# Patient Record
Sex: MALE | Race: WHITE | Employment: FULL TIME | URBAN - METROPOLITAN AREA
[De-identification: names, ages, dates, MRNs, and addresses within clinical notes are randomized per-mention and may not be internally consistent; named-entity substitution may affect disease eponyms.]

---

## 2017-06-15 PROBLEM — F90.0 ADHD (ATTENTION DEFICIT HYPERACTIVITY DISORDER), INATTENTIVE TYPE: Status: ACTIVE | Noted: 2017-06-15

## 2022-03-19 PROBLEM — F90.0 ADHD (ATTENTION DEFICIT HYPERACTIVITY DISORDER), INATTENTIVE TYPE: Status: ACTIVE | Noted: 2017-06-15

## 2022-05-18 ENCOUNTER — HOSPITAL ENCOUNTER (OUTPATIENT)
Dept: LAB | Age: 47
Discharge: HOME | End: 2022-05-18

## 2022-05-18 PROCEDURE — 88305 TISSUE EXAM BY PATHOLOGIST: CPT

## 2025-01-13 ENCOUNTER — PREP FOR PROCEDURE (OUTPATIENT)
Dept: ONCOLOGY | Age: 50
End: 2025-01-13

## 2025-01-13 ENCOUNTER — OFFICE VISIT (OUTPATIENT)
Dept: UROLOGY | Age: 50
End: 2025-01-13
Payer: COMMERCIAL

## 2025-01-13 DIAGNOSIS — N50.89 MASS OF RIGHT TESTICLE: Primary | ICD-10-CM

## 2025-01-13 DIAGNOSIS — N50.89 MASS OF RIGHT TESTICLE: ICD-10-CM

## 2025-01-13 LAB
BILIRUBIN, URINE, POC: NEGATIVE
BLOOD URINE, POC: NEGATIVE
GLUCOSE URINE, POC: NEGATIVE MG/DL
KETONES, URINE, POC: NEGATIVE MG/DL
LDH SERPL L TO P-CCNC: 199 U/L (ref 127–281)
LEUKOCYTE ESTERASE, URINE, POC: NEGATIVE
NITRITE, URINE, POC: NEGATIVE
PH, URINE, POC: 5.5 (ref 4.6–8)
PROTEIN,URINE, POC: NEGATIVE MG/DL
SPECIFIC GRAVITY, URINE, POC: 1 (ref 1–1.03)
URINALYSIS CLARITY, POC: NORMAL
URINALYSIS COLOR, POC: NORMAL
UROBILINOGEN, POC: NORMAL MG/DL

## 2025-01-13 PROCEDURE — 99204 OFFICE O/P NEW MOD 45 MIN: CPT | Performed by: UROLOGY

## 2025-01-13 PROCEDURE — 81003 URINALYSIS AUTO W/O SCOPE: CPT | Performed by: UROLOGY

## 2025-01-13 ASSESSMENT — ENCOUNTER SYMPTOMS
INDIGESTION: 0
CONSTIPATION: 0
EYE DISCHARGE: 0
SKIN LESIONS: 0
VOMITING: 0
COUGH: 0
ABDOMINAL PAIN: 0
EYE PAIN: 0
SHORTNESS OF BREATH: 0
DIARRHEA: 0
NAUSEA: 0
WHEEZING: 0
BLOOD IN STOOL: 0
BACK PAIN: 0
HEARTBURN: 0

## 2025-01-14 ENCOUNTER — TELEPHONE (OUTPATIENT)
Dept: ONCOLOGY | Age: 50
End: 2025-01-14

## 2025-01-14 LAB
AFP-TM SERPL-MCNC: 2.15 NG/ML (ref 0–8.3)
HCG INTACT+B SERPL-ACNC: <1 MIU/ML (ref 0–3)

## 2025-01-14 RX ORDER — IBUPROFEN 200 MG
400 TABLET ORAL EVERY 6 HOURS PRN
COMMUNITY

## 2025-01-14 NOTE — TELEPHONE ENCOUNTER
Attempted to return call to Sophia Cheung with [facility] about authorization.  The number given was not the correct number.    Attempted to reach [place] via teams messaging, as well as updating the referral where the prior auth was discussed.    Also sent an email over to Sophia as well with the needed information.

## 2025-01-14 NOTE — PERIOP NOTE
Patient verified name and .  Order for consent NOT found in EHR at time of PAT visit. Unable to verify case posting against order; surgery verified by patient.    Type 1B- listed as orchiectomy scrotal- surgery, phone assessment complete.  Orders not received.  Labs per surgeon: none ordered  Labs per anesthesia protocol: not indicated    Staff message sent to ORALIA Batres to clarify if the case is OP and if ERAS is indicated-- the patient stated \"I thought it was outpatient\", the case is marked surgery admit and ERAS.  Patient was seen by Dr. Ricks 25.     Patient answered medical/surgical history questions at their best of ability. All prior to admission medications documented in EPIC.    Patient instructed to continue taking all prescription medications up to the day of surgery but to take only the following medications the day of surgery according to anesthesia guidelines with a small sip of water: Adderall if needed.   Also, patient is requested to take 2 Tylenol in the morning and then again before bed on the day before surgery. Regular or extra strength may be used.       Patient informed that all vitamins and supplements should be held 7 days prior to surgery and NSAIDS 5 days prior to surgery. Prescription meds to hold:  none    Patient instructed on the following:    > Arrive at Main Entrance, time of arrival to be called the day before by 1700  > NPO after midnight, unless otherwise indicated, including gum, mints, and ice chips  > Please drink 32 ounces of non-caffeinated clear liquids 2 hours prior to your arrival to avoid dehydration.   > Responsible adult must drive patient to the hospital, stay during surgery, and patient will need supervision 24 hours after anesthesia  > Use non moisturizing soap in shower the night before surgery and on the morning of surgery  > All piercings must be removed prior to arrival.    > Leave all valuables (money and jewelry) at home but bring insurance card and

## 2025-01-14 NOTE — PERIOP NOTE
RE: surgery  Received: Today  Sparkle Batres Mary C RN  I verified with Dr. Hardy team, and the patient is correct.  This is to be outpatient surgery with NO eras protocol.    I apologize for the confusion.    I will call and update the patient      ERAS banner removed

## 2025-01-14 NOTE — TELEPHONE ENCOUNTER
Sophia with [facility] calling on behalf of pt to confirm prior authorization for surgery.    [phone] ex [phone]

## 2025-01-15 ENCOUNTER — ANESTHESIA EVENT (OUTPATIENT)
Dept: SURGERY | Age: 50
End: 2025-01-15
Payer: COMMERCIAL

## 2025-01-15 RX ORDER — SODIUM CHLORIDE 0.9 % (FLUSH) 0.9 %
5-40 SYRINGE (ML) INJECTION EVERY 12 HOURS SCHEDULED
Status: CANCELLED | OUTPATIENT
Start: 2025-01-15

## 2025-01-15 RX ORDER — SODIUM CHLORIDE 9 MG/ML
INJECTION, SOLUTION INTRAVENOUS PRN
Status: CANCELLED | OUTPATIENT
Start: 2025-01-15

## 2025-01-15 RX ORDER — SODIUM CHLORIDE 0.9 % (FLUSH) 0.9 %
5-40 SYRINGE (ML) INJECTION PRN
Status: CANCELLED | OUTPATIENT
Start: 2025-01-15

## 2025-01-16 ENCOUNTER — HOSPITAL ENCOUNTER (OUTPATIENT)
Age: 50
Setting detail: OUTPATIENT SURGERY
Discharge: HOME | End: 2025-01-16
Attending: UROLOGY | Admitting: UROLOGY
Payer: COMMERCIAL

## 2025-01-16 ENCOUNTER — ANESTHESIA (OUTPATIENT)
Dept: SURGERY | Age: 50
End: 2025-01-16
Payer: COMMERCIAL

## 2025-01-16 VITALS
SYSTOLIC BLOOD PRESSURE: 126 MMHG | RESPIRATION RATE: 18 BRPM | DIASTOLIC BLOOD PRESSURE: 74 MMHG | BODY MASS INDEX: 35.62 KG/M2 | HEIGHT: 72 IN | TEMPERATURE: 98.3 F | HEART RATE: 76 BPM | OXYGEN SATURATION: 93 % | WEIGHT: 263 LBS

## 2025-01-16 DIAGNOSIS — N50.89 MASS OF RIGHT TESTICLE: Primary | ICD-10-CM

## 2025-01-16 PROCEDURE — 3600000002 HC SURGERY LEVEL 2 BASE: Performed by: UROLOGY

## 2025-01-16 PROCEDURE — 2500000003 HC RX 250 WO HCPCS: Performed by: PHYSICIAN ASSISTANT

## 2025-01-16 PROCEDURE — 6370000000 HC RX 637 (ALT 250 FOR IP): Performed by: ANESTHESIOLOGY

## 2025-01-16 PROCEDURE — 7100000011 HC PHASE II RECOVERY - ADDTL 15 MIN: Performed by: UROLOGY

## 2025-01-16 PROCEDURE — 3600000012 HC SURGERY LEVEL 2 ADDTL 15MIN: Performed by: UROLOGY

## 2025-01-16 PROCEDURE — 6360000002 HC RX W HCPCS: Performed by: UROLOGY

## 2025-01-16 PROCEDURE — 3700000001 HC ADD 15 MINUTES (ANESTHESIA): Performed by: UROLOGY

## 2025-01-16 PROCEDURE — 6360000002 HC RX W HCPCS: Performed by: NURSE ANESTHETIST, CERTIFIED REGISTERED

## 2025-01-16 PROCEDURE — 7100000000 HC PACU RECOVERY - FIRST 15 MIN: Performed by: UROLOGY

## 2025-01-16 PROCEDURE — 54530 REMOVAL OF TESTIS: CPT | Performed by: UROLOGY

## 2025-01-16 PROCEDURE — 7100000001 HC PACU RECOVERY - ADDTL 15 MIN: Performed by: UROLOGY

## 2025-01-16 PROCEDURE — 7100000010 HC PHASE II RECOVERY - FIRST 15 MIN: Performed by: UROLOGY

## 2025-01-16 PROCEDURE — 6360000002 HC RX W HCPCS: Performed by: PHYSICIAN ASSISTANT

## 2025-01-16 PROCEDURE — 2709999900 HC NON-CHARGEABLE SUPPLY: Performed by: UROLOGY

## 2025-01-16 PROCEDURE — 6360000002 HC RX W HCPCS: Performed by: ANESTHESIOLOGY

## 2025-01-16 PROCEDURE — 2500000003 HC RX 250 WO HCPCS: Performed by: NURSE ANESTHETIST, CERTIFIED REGISTERED

## 2025-01-16 PROCEDURE — 3700000000 HC ANESTHESIA ATTENDED CARE: Performed by: UROLOGY

## 2025-01-16 PROCEDURE — 88305 TISSUE EXAM BY PATHOLOGIST: CPT

## 2025-01-16 PROCEDURE — 6360000002 HC RX W HCPCS: Performed by: REGISTERED NURSE

## 2025-01-16 RX ORDER — SODIUM CHLORIDE 0.9 % (FLUSH) 0.9 %
5-40 SYRINGE (ML) INJECTION PRN
Status: DISCONTINUED | OUTPATIENT
Start: 2025-01-16 | End: 2025-01-16 | Stop reason: HOSPADM

## 2025-01-16 RX ORDER — ACETAMINOPHEN 500 MG
1000 TABLET ORAL ONCE
Status: COMPLETED | OUTPATIENT
Start: 2025-01-16 | End: 2025-01-16

## 2025-01-16 RX ORDER — BUPIVACAINE HYDROCHLORIDE 2.5 MG/ML
INJECTION, SOLUTION EPIDURAL; INFILTRATION; INTRACAUDAL PRN
Status: DISCONTINUED | OUTPATIENT
Start: 2025-01-16 | End: 2025-01-16 | Stop reason: ALTCHOICE

## 2025-01-16 RX ORDER — SODIUM CHLORIDE, SODIUM LACTATE, POTASSIUM CHLORIDE, CALCIUM CHLORIDE 600; 310; 30; 20 MG/100ML; MG/100ML; MG/100ML; MG/100ML
INJECTION, SOLUTION INTRAVENOUS CONTINUOUS
Status: DISCONTINUED | OUTPATIENT
Start: 2025-01-16 | End: 2025-01-16 | Stop reason: HOSPADM

## 2025-01-16 RX ORDER — SENNA AND DOCUSATE SODIUM 50; 8.6 MG/1; MG/1
1 TABLET, FILM COATED ORAL DAILY
Qty: 30 TABLET | Refills: 0 | Status: SHIPPED | OUTPATIENT
Start: 2025-01-16

## 2025-01-16 RX ORDER — SODIUM CHLORIDE 9 MG/ML
INJECTION, SOLUTION INTRAVENOUS PRN
Status: DISCONTINUED | OUTPATIENT
Start: 2025-01-16 | End: 2025-01-16 | Stop reason: HOSPADM

## 2025-01-16 RX ORDER — SODIUM CHLORIDE 0.9 % (FLUSH) 0.9 %
5-40 SYRINGE (ML) INJECTION EVERY 12 HOURS SCHEDULED
Status: DISCONTINUED | OUTPATIENT
Start: 2025-01-16 | End: 2025-01-16 | Stop reason: HOSPADM

## 2025-01-16 RX ORDER — HYDROCODONE BITARTRATE AND ACETAMINOPHEN 5; 325 MG/1; MG/1
1 TABLET ORAL EVERY 8 HOURS PRN
Qty: 20 TABLET | Refills: 0 | Status: SHIPPED | OUTPATIENT
Start: 2025-01-16 | End: 2025-01-23

## 2025-01-16 RX ORDER — PROPOFOL 10 MG/ML
INJECTION, EMULSION INTRAVENOUS
Status: DISCONTINUED | OUTPATIENT
Start: 2025-01-16 | End: 2025-01-16 | Stop reason: SDUPTHER

## 2025-01-16 RX ORDER — LIDOCAINE HYDROCHLORIDE 20 MG/ML
INJECTION, SOLUTION EPIDURAL; INFILTRATION; INTRACAUDAL; PERINEURAL
Status: DISCONTINUED | OUTPATIENT
Start: 2025-01-16 | End: 2025-01-16 | Stop reason: SDUPTHER

## 2025-01-16 RX ORDER — FENTANYL CITRATE 50 UG/ML
INJECTION, SOLUTION INTRAMUSCULAR; INTRAVENOUS
Status: DISCONTINUED | OUTPATIENT
Start: 2025-01-16 | End: 2025-01-16 | Stop reason: SDUPTHER

## 2025-01-16 RX ORDER — FENTANYL CITRATE 50 UG/ML
100 INJECTION, SOLUTION INTRAMUSCULAR; INTRAVENOUS
Status: DISCONTINUED | OUTPATIENT
Start: 2025-01-16 | End: 2025-01-16 | Stop reason: HOSPADM

## 2025-01-16 RX ORDER — NALOXONE HYDROCHLORIDE 0.4 MG/ML
INJECTION, SOLUTION INTRAMUSCULAR; INTRAVENOUS; SUBCUTANEOUS PRN
Status: DISCONTINUED | OUTPATIENT
Start: 2025-01-16 | End: 2025-01-16 | Stop reason: HOSPADM

## 2025-01-16 RX ORDER — DEXAMETHASONE SODIUM PHOSPHATE 10 MG/ML
INJECTION INTRAMUSCULAR; INTRAVENOUS
Status: DISCONTINUED | OUTPATIENT
Start: 2025-01-16 | End: 2025-01-16 | Stop reason: SDUPTHER

## 2025-01-16 RX ORDER — IBUPROFEN 600 MG/1
1 TABLET ORAL PRN
Status: DISCONTINUED | OUTPATIENT
Start: 2025-01-16 | End: 2025-01-16 | Stop reason: HOSPADM

## 2025-01-16 RX ORDER — OXYCODONE HYDROCHLORIDE 5 MG/1
5 TABLET ORAL
Status: DISCONTINUED | OUTPATIENT
Start: 2025-01-16 | End: 2025-01-16 | Stop reason: HOSPADM

## 2025-01-16 RX ORDER — ONDANSETRON 2 MG/ML
INJECTION INTRAMUSCULAR; INTRAVENOUS
Status: DISCONTINUED | OUTPATIENT
Start: 2025-01-16 | End: 2025-01-16 | Stop reason: SDUPTHER

## 2025-01-16 RX ORDER — MIDAZOLAM HYDROCHLORIDE 2 MG/2ML
2 INJECTION, SOLUTION INTRAMUSCULAR; INTRAVENOUS
Status: DISCONTINUED | OUTPATIENT
Start: 2025-01-16 | End: 2025-01-16 | Stop reason: HOSPADM

## 2025-01-16 RX ORDER — LIDOCAINE HYDROCHLORIDE 20 MG/ML
INJECTION, SOLUTION INFILTRATION; PERINEURAL PRN
Status: DISCONTINUED | OUTPATIENT
Start: 2025-01-16 | End: 2025-01-16 | Stop reason: ALTCHOICE

## 2025-01-16 RX ORDER — SODIUM CHLORIDE 0.9 % (FLUSH) 0.9 %
SYRINGE (ML) INJECTION
Status: DISCONTINUED | OUTPATIENT
Start: 2025-01-16 | End: 2025-01-16 | Stop reason: SDUPTHER

## 2025-01-16 RX ORDER — MIDAZOLAM HYDROCHLORIDE 1 MG/ML
INJECTION, SOLUTION INTRAMUSCULAR; INTRAVENOUS
Status: DISCONTINUED | OUTPATIENT
Start: 2025-01-16 | End: 2025-01-16 | Stop reason: SDUPTHER

## 2025-01-16 RX ORDER — DIPHENHYDRAMINE HYDROCHLORIDE 50 MG/ML
12.5 INJECTION INTRAMUSCULAR; INTRAVENOUS
Status: DISCONTINUED | OUTPATIENT
Start: 2025-01-16 | End: 2025-01-16 | Stop reason: HOSPADM

## 2025-01-16 RX ORDER — LIDOCAINE HYDROCHLORIDE 10 MG/ML
1 INJECTION, SOLUTION INFILTRATION; PERINEURAL
Status: DISCONTINUED | OUTPATIENT
Start: 2025-01-16 | End: 2025-01-16 | Stop reason: HOSPADM

## 2025-01-16 RX ORDER — DEXTROSE MONOHYDRATE 100 MG/ML
INJECTION, SOLUTION INTRAVENOUS CONTINUOUS PRN
Status: DISCONTINUED | OUTPATIENT
Start: 2025-01-16 | End: 2025-01-16 | Stop reason: HOSPADM

## 2025-01-16 RX ORDER — PROCHLORPERAZINE EDISYLATE 5 MG/ML
5 INJECTION INTRAMUSCULAR; INTRAVENOUS
Status: DISCONTINUED | OUTPATIENT
Start: 2025-01-16 | End: 2025-01-16 | Stop reason: HOSPADM

## 2025-01-16 RX ADMIN — CEFAZOLIN 2000 MG: 3 INJECTION, POWDER, FOR SOLUTION INTRAMUSCULAR; INTRAVENOUS at 13:03

## 2025-01-16 RX ADMIN — FENTANYL CITRATE 50 MCG: 50 INJECTION, SOLUTION INTRAMUSCULAR; INTRAVENOUS at 13:45

## 2025-01-16 RX ADMIN — DEXAMETHASONE SODIUM PHOSPHATE 8 MG: 10 INJECTION INTRAMUSCULAR; INTRAVENOUS at 13:04

## 2025-01-16 RX ADMIN — PROPOFOL 300 MG: 10 INJECTION, EMULSION INTRAVENOUS at 12:58

## 2025-01-16 RX ADMIN — SODIUM CHLORIDE, PRESERVATIVE FREE 30 ML: 5 INJECTION INTRAVENOUS at 12:58

## 2025-01-16 RX ADMIN — SODIUM CHLORIDE, PRESERVATIVE FREE 30 ML: 5 INJECTION INTRAVENOUS at 14:05

## 2025-01-16 RX ADMIN — FENTANYL CITRATE 50 MCG: 50 INJECTION, SOLUTION INTRAMUSCULAR; INTRAVENOUS at 13:20

## 2025-01-16 RX ADMIN — HYDROMORPHONE HYDROCHLORIDE 0.5 MG: 1 INJECTION, SOLUTION INTRAMUSCULAR; INTRAVENOUS; SUBCUTANEOUS at 14:51

## 2025-01-16 RX ADMIN — ACETAMINOPHEN 1000 MG: 500 TABLET, FILM COATED ORAL at 10:10

## 2025-01-16 RX ADMIN — ONDANSETRON 4 MG: 2 INJECTION INTRAMUSCULAR; INTRAVENOUS at 14:05

## 2025-01-16 RX ADMIN — LIDOCAINE HYDROCHLORIDE 100 MG: 20 INJECTION, SOLUTION EPIDURAL; INFILTRATION; INTRACAUDAL; PERINEURAL at 12:58

## 2025-01-16 RX ADMIN — SODIUM CHLORIDE, PRESERVATIVE FREE 30 ML: 5 INJECTION INTRAVENOUS at 13:25

## 2025-01-16 RX ADMIN — MIDAZOLAM 2 MG: 1 INJECTION INTRAMUSCULAR; INTRAVENOUS at 12:48

## 2025-01-16 RX ADMIN — FENTANYL CITRATE 25 MCG: 50 INJECTION, SOLUTION INTRAMUSCULAR; INTRAVENOUS at 13:13

## 2025-01-16 RX ADMIN — FENTANYL CITRATE 25 MCG: 50 INJECTION, SOLUTION INTRAMUSCULAR; INTRAVENOUS at 13:10

## 2025-01-16 ASSESSMENT — ENCOUNTER SYMPTOMS
VOMITING: 0
ABDOMINAL PAIN: 0
DIARRHEA: 0
SKIN LESIONS: 0
SHORTNESS OF BREATH: 0
NAUSEA: 0
CONSTIPATION: 0
WHEEZING: 0
BLOOD IN STOOL: 0
HEARTBURN: 0
COUGH: 0
INDIGESTION: 0
EYE PAIN: 0
BACK PAIN: 0
EYE DISCHARGE: 0

## 2025-01-16 ASSESSMENT — PAIN SCALES - GENERAL
PAINLEVEL_OUTOF10: 2
PAINLEVEL_OUTOF10: 9

## 2025-01-16 ASSESSMENT — PAIN - FUNCTIONAL ASSESSMENT: PAIN_FUNCTIONAL_ASSESSMENT: 0-10

## 2025-01-16 ASSESSMENT — PAIN DESCRIPTION - LOCATION: LOCATION: GROIN

## 2025-01-16 ASSESSMENT — PAIN DESCRIPTION - DESCRIPTORS: DESCRIPTORS: ACHING

## 2025-01-16 ASSESSMENT — PAIN DESCRIPTION - ORIENTATION: ORIENTATION: ANTERIOR;LOWER

## 2025-01-16 NOTE — ANESTHESIA PRE PROCEDURE
Department of Anesthesiology  Preprocedure Note       Name:  Shady Shanks   Age:  49 y.o.  :  1975                                          MRN:  [id number]         Date:  2025      Surgeon: Surgeon(s):  Chris Hardy MD    Procedure: Procedure(s):  ORCHIECTOMY SCROTAL    Medications prior to admission:   Prior to Admission medications    Medication Sig Start Date End Date Taking? Authorizing Provider   ibuprofen (ADVIL;MOTRIN) 200 MG tablet Take 2 tablets by mouth every 6 hours as needed for Pain   Yes Provider, MD Silvina   amphetamine-dextroamphetamine (ADDERALL) 20 MG tablet Earliest Fill Date: 19.  1 po bid Fill in 60 days 19  Yes Automatic Reconciliation, Ar       Current medications:    Current Facility-Administered Medications   Medication Dose Route Frequency Provider Last Rate Last Admin   • lidocaine 1 % injection 1 mL  1 mL IntraDERmal Once PRN Keny Meeks MD       • acetaminophen (TYLENOL) tablet 1,000 mg  1,000 mg Oral Once Keny Meeks MD       • fentaNYL (SUBLIMAZE) injection 100 mcg  100 mcg IntraVENous Once PRN Keny Meeks MD       • lactated ringers infusion   IntraVENous Continuous Keny Meeks MD       • sodium chloride flush 0.9 % injection 5-40 mL  5-40 mL IntraVENous 2 times per day Keny Meeks MD       • sodium chloride flush 0.9 % injection 5-40 mL  5-40 mL IntraVENous PRN Keny Meeks MD       • 0.9 % sodium chloride infusion   IntraVENous PRN Keny Meeks MD       • midazolam PF (VERSED) injection 2 mg  2 mg IntraVENous Once PRN Keny Meeks MD       • sodium chloride flush 0.9 % injection 5-40 mL  5-40 mL IntraVENous 2 times per day Nina De La Fuente, PA       • sodium chloride flush 0.9 % injection 5-40 mL  5-40 mL IntraVENous PRN Nina De La Fuente, PA       • 0.9 % sodium chloride infusion   IntraVENous PRN Sudhakar Nina H, PA       • ceFAZolin (ANCEF) 3,000 mg in sterile water 20 mL IV syringe  3,000 mg IntraVENous On Call to OR

## 2025-01-16 NOTE — ANESTHESIA POSTPROCEDURE EVALUATION
Department of Anesthesiology  Postprocedure Note    Patient: Shady Shanks  MRN: [id number]  YOB: 1975  Date of evaluation: 1/16/2025    Procedure Summary       Date: 01/16/25 Room / Location: [facility] MAIN OR 03 / [facility] MAIN OR    Anesthesia Start: 1253 Anesthesia Stop: 1421    Procedure: ORCHIECTOMY SCROTAL (Right: Pelvis) Diagnosis:       Mass of right testicle      (Mass of right testicle [N50.89])    Providers: Chris Hardy MD Responsible Provider: Keny Meeks MD    Anesthesia Type: General ASA Status: 2            Anesthesia Type: General    Toño Phase I: Toño Score: 10    Toño Phase II: Toño Score: 10    Anesthesia Post Evaluation    Patient location during evaluation: PACU  Patient participation: complete - patient participated  Level of consciousness: awake and alert  Airway patency: patent  Nausea: well controlled.  Cardiovascular status: acceptable.  Respiratory status: acceptable  Hydration status: stable  Pain management: adequate    No notable events documented.

## 2025-01-16 NOTE — H&P
H&P Update:    The patient's last History and Physical was reviewed, and I examined the patient today.  There are no changes.  The surgical procedure and site were confirmed by the patient and me.  I met patient and his wife in preop.  His serum tumor markers were negative.  On physical exam he does have an enlarged firm right testicle.  There is also a component of a hydrocele.  I reviewed his old ultrasound and it appears to have a solid mass involving most of the right testicle.  His left testicle was normal on ultrasound and exam.    PE:  NAD  Heart- Reg, normal perfusion  Pulmonary- clear, normal respiratory effort  Abdomen- Soft, ND     Plan: The risks, benefits, and alternative treatment options were again discussed with the patient as outlined below.  The patient understands and wants to proceed with the procedure-- right radical orchiectomy.         We discussed the differential diagnosis of this lesion, including possible malignant and benign causes.  We recommend moving forward with radical orchiectomy.  We discussed the rationale and details of the procedure and why it is typically performed through an inguinal incision.  We discussed the risks of surgery in detail, including but not limited to; infection, bleeding, the possible need for a blood transfusion, damage to adjacent organs, cancer recurrence, possibility of benign pathology, and other medical complications such as  DVT, PE, MI, CVA, and the rare possibility of death from surgical or anesthetic complications.  He understands this procedure's potential impact on his fertility and testosterone production. He understands the risks, benefits, and alternatives of treatment, and wishes to proceed with right radical orchiectomy.       [facility]  [address] [place] [address]  [phone]          Shady Shanks  : 1975    No chief complaint on file.         [facility] the [place]     Shady Shanks is a 49  Detail Level: Zone

## 2025-01-16 NOTE — DISCHARGE INSTRUCTIONS
My office will schedule your follow-up appointment.    Please call our office ([phone]) or return to ED if you experience fever > 101.5, severe pain, persistent nausea/vomiting, excessive bleeding, inability to urinate, or other concerns.     Pain medications called in to pharmacy.      If you have had surgery in the past 7-10 days by one of our providers and are having fever, bleeding, or drainage from an incision, have an opening in an incision, or having issues urinating properly, please call [phone] during normal office hours. Please call [phone] for any immediate needs AFTER HOURS.     After your surgery, you may feel more tired than usual and have some mild groin pain for several days. Your groin and scrotum may be swollen or bruised. This usually gets better in 2 to 3 weeks.  You will probably be able to go back to work or school 4 to 7 days after surgery. But you will need to avoid strenuous exercise or heavy lifting for 2 to 4 weeks.  This care sheet gives you a general idea about how long it will take for you to recover. But each person recovers at a different pace. Follow the steps below to get better as quickly as possible.  How can you care for yourself at home?  Activity  Rest when you feel tired. Getting enough sleep will help you recover.  Try to walk each day. Start by walking a little more than you did the day before. Bit by bit, increase the amount you walk. Walking boosts blood flow and helps prevent pneumonia and constipation.  You may shower 24 hours after surgery, if your doctor says it is okay. Pat the cut (incision) dry. Do not take a bath for the first week, or until your doctor tells you it is okay.  You may return to work or school when you are ready. This is usually in about 4 to 7 days.  Avoid strenuous activities, such as bicycle riding, jogging, weight lifting, or aerobic exercise, until your doctor says it is okay.  For 2 to 4 weeks, avoid lifting anything that would

## 2025-01-16 NOTE — OP NOTE
Patient:  Shady Shanks, [id number]    01/16/25      Date of Procedure:  01/16/25    PREOPERATIVE DIAGNOSIS:  Right  testicular mass.    POSTOPERATIVE DIAGNOSIS:  Right testicular mass.    PROCEDURE PERFORMED:  Right radical orchiectomy via an inguinal approach.    SURGEON:  Chris Hardy MD    ASSISTANT:  Antonella Domínguez    ANESTHESIA:  General with endotracheal tube.    COMPLICATIONS:  None.    SPECIMENS REMOVED:  Right  testicle and distal spermatic cord.    IMPLANTS:  None.    ESTIMATED BLOOD LOSS:  Minimal.    INDICATIONS FOR PROCEDURE:  The patient is a very pleasant 49-year-old who presented with testicular mass.  The scrotal ultrasound showed concern for a solid mass.    Serum tumor markers were within normal limits.  After further discussion, the recommendation was for a radical orchiectomy.  I discussed the potential risks of the surgery and he signed the consent to move forward.    DETAILS OF THE PROCEDURE:  After informed consent was obtained, he was taken to the operating room #3 in the [facility].  After induction of general anesthesia and placement of an endotracheal tube, his lower abdomen and genitalia were all prepped and draped in usual sterile fashion.  A time-out was performed.  He was given Ancef as a prophylactic antibiotic.  I then made an approximately 5-6-cm incision just above and lateral to his inguinal ring in his right groin.  Using electrocautery, it was taken down through the subcutaneous tissues to his external oblique fascia.  Here I identified the inguinal ring inside the fascia just lateral to it.  Using Topaz I then opened up the external oblique fascia into the inguinal ring.  I then carefully dissected off the ilioinguinal nerve to preserve it.  I then dissected out the spermatic cord and encircled it with a Penrose drain as a tourniquet.    I then focused on delivering the testicle from the scrotum through the inguinal incision.    We then very carefully

## 2025-01-28 NOTE — PROGRESS NOTES
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  Patient is here today with his wife for follow-up after his right radical orchiectomy on 2025.  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    He had a CT of the chest abdomen pelvis on 2025 that was negative for any metastatic disease.  Repeat serum tumor markers today were all ordered when within normal limits.    He has no complaints.  His incisions healed well.  He denies any pain or scrotal swelling.    PMH:     Past Medical History:   Diagnosis Date    ADHD     Testicular swelling        PSH:    Past Surgical History:   Procedure Laterality Date    COLONOSCOPY      TESTICLE REMOVAL Right 2025    ORCHIECTOMY SCROTAL performed by Chris Hardy MD at [facility] MAIN OR    TONSILLECTOMY      WISDOM TOOTH EXTRACTION         MEDs:    Current Outpatient Medications   Medication Sig Dispense Refill    amphetamine-dextroamphetamine (ADDERALL) 30 MG tablet Take 1 tablet by mouth 2 times daily.      sennosides-docusate sodium (SENOKOT-S) 8.6-50 MG tablet Take 1 tablet by mouth daily (Patient not taking: Reported on 2025) 30 tablet 0    ibuprofen (ADVIL;MOTRIN) 200 MG tablet Take 2 tablets by mouth every 6 hours as needed for Pain (Patient not taking: Reported on 2025)      amphetamine-dextroamphetamine (ADDERALL) 20 MG tablet Earliest Fill Date: 19.  1 po bid Fill in 60 days (Patient not taking: Reported on 2025)       Current Facility-Administered Medications   Medication Dose Route Frequency Provider Last Rate Last Admin    diatrizoate meglumine-sodium (GASTROGRAFIN) 66-10 %

## 2025-01-31 ENCOUNTER — OFFICE VISIT (OUTPATIENT)
Dept: ONCOLOGY | Age: 50
End: 2025-01-31
Payer: COMMERCIAL

## 2025-01-31 ENCOUNTER — HOSPITAL ENCOUNTER (OUTPATIENT)
Dept: LAB | Age: 50
Discharge: HOME | End: 2025-01-31
Payer: COMMERCIAL

## 2025-01-31 VITALS
DIASTOLIC BLOOD PRESSURE: 66 MMHG | HEIGHT: 72 IN | BODY MASS INDEX: 36.46 KG/M2 | TEMPERATURE: 97.8 F | WEIGHT: 269.2 LBS | OXYGEN SATURATION: 94 % | HEART RATE: 81 BPM | RESPIRATION RATE: 16 BRPM | SYSTOLIC BLOOD PRESSURE: 120 MMHG

## 2025-01-31 DIAGNOSIS — N50.89 MASS OF RIGHT TESTICLE: ICD-10-CM

## 2025-01-31 DIAGNOSIS — C62.91: Primary | ICD-10-CM

## 2025-01-31 LAB
AFP-TM SERPL-MCNC: 1.93 NG/ML (ref 0–8.3)
HCG SERPL-ACNC: <1 MIU/ML
LDH SERPL L TO P-CCNC: 164 U/L (ref 127–281)

## 2025-01-31 PROCEDURE — 82105 ALPHA-FETOPROTEIN SERUM: CPT

## 2025-01-31 PROCEDURE — 84702 CHORIONIC GONADOTROPIN TEST: CPT

## 2025-01-31 PROCEDURE — 83615 LACTATE (LD) (LDH) ENZYME: CPT

## 2025-01-31 PROCEDURE — 36415 COLL VENOUS BLD VENIPUNCTURE: CPT

## 2025-01-31 PROCEDURE — 99215 OFFICE O/P EST HI 40 MIN: CPT | Performed by: UROLOGY

## 2025-01-31 RX ORDER — DEXTROAMPHETAMINE SACCHARATE, AMPHETAMINE ASPARTATE, DEXTROAMPHETAMINE SULFATE AND AMPHETAMINE SULFATE 7.5; 7.5; 7.5; 7.5 MG/1; MG/1; MG/1; MG/1
1 TABLET ORAL 2 TIMES DAILY
COMMUNITY
Start: 2024-12-20

## 2025-01-31 ASSESSMENT — PATIENT HEALTH QUESTIONNAIRE - PHQ9
1. LITTLE INTEREST OR PLEASURE IN DOING THINGS: NOT AT ALL
SUM OF ALL RESPONSES TO PHQ QUESTIONS 1-9: 0
2. FEELING DOWN, DEPRESSED OR HOPELESS: NOT AT ALL
SUM OF ALL RESPONSES TO PHQ QUESTIONS 1-9: 0
SUM OF ALL RESPONSES TO PHQ QUESTIONS 1-9: 0
SUM OF ALL RESPONSES TO PHQ9 QUESTIONS 1 & 2: 0
SUM OF ALL RESPONSES TO PHQ QUESTIONS 1-9: 0

## 2025-01-31 ASSESSMENT — ENCOUNTER SYMPTOMS
RESPIRATORY NEGATIVE: 1
GASTROINTESTINAL NEGATIVE: 1

## 2025-01-31 NOTE — PATIENT INSTRUCTIONS
Patient Information from Today's Visit    The members of your Oncology Medical Home are listed below:    Physician Provider: Chris Hardy Urologic Oncologist  Advanced Practice Clinician: COLT King  Nurse Navigator: N/A  Medical Assistant: Kristy LAO CMA  :Sparkle PORTILLO  Supportive Care Services: Danie BEATTY LMSW    Diagnosis: Testicular     Follow Up Instructions:   Pathology reviewed  CT scan reviewed  We do not need to do anything else treatment wise at this time.  Discussion of active surveillance.  We will plan to see you back in 4 months with a CT scan. You can call radiology raquel at [phone] to get this scheduled.  If you need anything prior please do not hesitate to call our office.  Treatment Summary has been discussed and given to patient:N/A      Current Labs:   Hospital Outpatient Visit on 01/31/2025   Component Date Value Ref Range Status    LD 01/31/2025 164  127 - 281 U/L Final               Please refer to After Visit Summary or [employer] for upcoming appointment information. Please call our office for rescheduling needs at least 24 hours before your scheduled appointment time.If you have any questions regarding your upcoming schedule please reach out to your care team through [employer] or call [phone].     Please notify your assigned Nurse Navigator of any unplanned hospital admissions or Emergency Room visits within 24 hours of discharge.    -------------------------------------------------------------------------------------------------------------------  Please call our office at [phone] if you have any  of the following symptoms:   Fever of 100.5 or greater  Chills  Shortness of breath  Swelling or pain in one leg    After office hours an answering service is available and will contact a provider for emergencies or if you are experiencing any of the above symptoms.        Kristy Mackenzie MA

## 2025-06-02 ENCOUNTER — HOSPITAL ENCOUNTER (OUTPATIENT)
Dept: CT IMAGING | Age: 50
Discharge: HOME | End: 2025-06-04
Payer: COMMERCIAL

## 2025-06-02 DIAGNOSIS — C62.91: ICD-10-CM

## 2025-06-02 LAB
HCG SERPL-ACNC: <1 MIU/ML
LDH SERPL L TO P-CCNC: 163 U/L (ref 127–281)

## 2025-06-02 PROCEDURE — 6360000004 HC RX CONTRAST MEDICATION: Performed by: UROLOGY

## 2025-06-02 PROCEDURE — 74177 CT ABD & PELVIS W/CONTRAST: CPT

## 2025-06-02 RX ORDER — IOPAMIDOL 755 MG/ML
100 INJECTION, SOLUTION INTRAVASCULAR
Status: COMPLETED | OUTPATIENT
Start: 2025-06-02 | End: 2025-06-02

## 2025-06-02 RX ADMIN — IOPAMIDOL 100 ML: 755 INJECTION, SOLUTION INTRAVENOUS at 10:00

## 2025-06-03 LAB — AFP-TM SERPL-MCNC: 2.17 NG/ML (ref 0–8.3)

## 2025-06-09 ENCOUNTER — OFFICE VISIT (OUTPATIENT)
Age: 50
End: 2025-06-09
Payer: COMMERCIAL

## 2025-06-09 VITALS
WEIGHT: 264.6 LBS | HEART RATE: 71 BPM | TEMPERATURE: 98 F | HEIGHT: 72 IN | DIASTOLIC BLOOD PRESSURE: 85 MMHG | SYSTOLIC BLOOD PRESSURE: 114 MMHG | OXYGEN SATURATION: 97 % | RESPIRATION RATE: 15 BRPM | BODY MASS INDEX: 35.84 KG/M2

## 2025-06-09 DIAGNOSIS — C62.91: Primary | ICD-10-CM

## 2025-06-09 PROCEDURE — 99213 OFFICE O/P EST LOW 20 MIN: CPT | Performed by: UROLOGY

## 2025-06-09 ASSESSMENT — PATIENT HEALTH QUESTIONNAIRE - PHQ9
SUM OF ALL RESPONSES TO PHQ QUESTIONS 1-9: 0
2. FEELING DOWN, DEPRESSED OR HOPELESS: NOT AT ALL
SUM OF ALL RESPONSES TO PHQ QUESTIONS 1-9: 0
SUM OF ALL RESPONSES TO PHQ QUESTIONS 1-9: 0
1. LITTLE INTEREST OR PLEASURE IN DOING THINGS: NOT AT ALL
SUM OF ALL RESPONSES TO PHQ QUESTIONS 1-9: 0

## 2025-06-09 ASSESSMENT — ENCOUNTER SYMPTOMS
GASTROINTESTINAL NEGATIVE: 1
RESPIRATORY NEGATIVE: 1

## 2025-06-09 NOTE — PATIENT INSTRUCTIONS
Patient Information from Today's Visit    The members of your Oncology Medical Home are listed below:    Physician Provider: Chris Hardy Urologic Oncologist  Advanced Practice Clinician: COLT King  Medical Assistant: Kristy LAO CMA  :Amelia GARVEY  Supportive Care Services: Danie BEATTY LMSW    Diagnosis (Information Sheet Provided on Day of Diagnosis): Testicular     Follow Up Instructions:   CT scan reviewed   Labs reviewed    exam performed   We will plan to see you back in 6 months  with a repeat CT scan. You can call radiology scheduling at [phone] to get this scheduled.  If you need anything prior please do not hesitate to call our office.  Has Treatment Plan Been Finalized? No    Current Labs:   No visits with results within 3 Day(s) from this visit.   Latest known visit with results is:   Orders Only on 06/02/2025   Component Date Value Ref Range Status    AFP-Tumor Marker 06/02/2025 2.17  0.00 - 8.30 ng/mL Final    Comment: Roche ECLIA methodology  Patient's results of tumor marker testing may not be comparable to labs using different manufacturers/methods.      hCG Quant 06/02/2025 <1  MIU/ML Final    Comment:   Male   <3 mIU/mL  Female <5 mIU/mL    Weeks of Gestation  mIU/mL  3                   6-71  4                     5                   [id number]  6                   995-16744  7                   [id number]  8                   72636-136752  9                   88406-539836  10                  70402-817133  12                  [id number]  14                  39472-11291  15                  60363-56103  16                  9040-32686  17                  8175-93088  18                  44953-06430       06/02/2025 163  127 - 281 U/L Final           Please refer to After Visit Summary or MyChart for upcoming appointment information. Please call our office for rescheduling needs at least 24 hours before your scheduled appointment time.If you have any questions

## 2025-06-09 NOTE — PROGRESS NOTES
ADRENALS: Normal.  - KIDNEYS/URETERS: No hydronephrosis or significant mass.  - BLADDER: Normal.  - REPRODUCTIVE ORGANS: No pelvic masses.    - BOWEL: Normal caliber.  No inflammatory changes. Appendix is normal in size.  - LYMPH NODES: Stable small inguinal lymph nodes. No significant  retroperitoneal, mesenteric, or pelvic adenopathy.  - BONES: Degenerative disc disease at L4-5. No fracture or significant bone  lesion.  - VASCULATURE: Normal  - OTHER: No ascites.    - Impression -  1. Stable CT. No evidence of metastatic disease in the abdomen/pelvis      If providers have any questions about this report, I can be reached on  [employer].      Electronically signed by WINSTON CHURCHILL              ASSESSMENT:    Mr. Shady Shanks is a 49 y.o. male with a diagnosis of spermatocytic tumor of right testis.  I see no evidence of disease recurrence.  I recommended repeat CT abdomen pelvis with IV contrast and serum tumor markers in 6 months.  If those are normal we will plan to space his appointments out to once a year.        PLAN:   -Labs reviewed STMs  -CT scan reviewed  - exam performed   -RTC in 6 months w/ labs prior STMs  -CT scan prior   ________________________________________      I have seen and examined this patient.  I have reviewed and edited the note started by the MA and agree with the outlined plan.  Part of this note was written by using a voice dictation software. The note has been proof read but may still contain some grammatical/other typographical errors.      Floyd Hardy MD  Urologic Oncology  [facility]

## (undated) DEVICE — SUTURE VICRYL + SZ 3-0 L27IN ABSRB UD L26MM SH 1/2 CIR VCP416H

## (undated) DEVICE — DRAIN SURG PENROSE 0.25X12 IN CLOSED WND DRAINAGE PREM SIL

## (undated) DEVICE — GLOVE SURG SZ 75 L12IN FNGR THK79MIL GRN LTX FREE

## (undated) DEVICE — SUTURE MONOCRYL SZ 4-0 L18IN ABSRB UD L19MM PS-2 3/8 CIR PRIM Y496G

## (undated) DEVICE — SUTURE PERMA-HAND SZ 2-0 L30IN NONABSORBABLE BLK L26MM SH K833H

## (undated) DEVICE — SUTURE PERMAHAND SZ 0 L18IN NONABSORBABLE BLK SILK BRAID A186H

## (undated) DEVICE — MINOR SPLIT GENERAL: Brand: MEDLINE INDUSTRIES, INC.

## (undated) DEVICE — SYRINGE MED 10ML LUERLOCK TIP W/O SFTY DISP

## (undated) DEVICE — ATHLETIC SUPPORTER LATEX FREE, LARGE

## (undated) DEVICE — SKIN PREP TRAY 4 COMPARTM TRAY: Brand: MEDLINE INDUSTRIES, INC.

## (undated) DEVICE — NEEDLE HYPO 21GA L1.5IN INTRAMUSCULAR S STL LATCH BVL UP

## (undated) DEVICE — ABSORBENT, WATERPROOF, BACTERIA PROOF FILM DRESSING: Brand: OPSITE POST OP 20X10CM CTN 20

## (undated) DEVICE — GLOVE SURG SZ 7.5 L11.73IN FNGR THK9.8MIL STRW LTX POLYMER